# Patient Record
Sex: FEMALE | Race: OTHER | Employment: OTHER | URBAN - METROPOLITAN AREA
[De-identification: names, ages, dates, MRNs, and addresses within clinical notes are randomized per-mention and may not be internally consistent; named-entity substitution may affect disease eponyms.]

---

## 2017-01-24 ENCOUNTER — OFFICE VISIT (OUTPATIENT)
Dept: HEMATOLOGY | Age: 72
End: 2017-01-24

## 2017-01-24 ENCOUNTER — HOSPITAL ENCOUNTER (OUTPATIENT)
Dept: LAB | Age: 72
Discharge: HOME | End: 2017-01-24
Payer: MEDICARE

## 2017-01-24 VITALS
DIASTOLIC BLOOD PRESSURE: 48 MMHG | HEART RATE: 86 BPM | WEIGHT: 104 LBS | OXYGEN SATURATION: 98 % | SYSTOLIC BLOOD PRESSURE: 138 MMHG | RESPIRATION RATE: 16 BRPM | HEIGHT: 59 IN | BODY MASS INDEX: 20.96 KG/M2 | TEMPERATURE: 97.2 F

## 2017-01-24 DIAGNOSIS — K74.3 PRIMARY BILIARY CHOLANGITIS (HCC): Primary | ICD-10-CM

## 2017-01-24 DIAGNOSIS — K74.3 PRIMARY BILIARY CHOLANGITIS (HCC): ICD-10-CM

## 2017-01-24 LAB
ALBUMIN SERPL BCP-MCNC: 2.1 G/DL (ref 3.4–5)
ALBUMIN/GLOB SERPL: 0.4 {RATIO} (ref 0.8–1.7)
ALP SERPL-CCNC: 126 U/L (ref 45–117)
ALT SERPL-CCNC: 36 U/L (ref 13–56)
AMMONIA PLAS-SCNC: 41 UMOL/L (ref 11–32)
ANION GAP BLD CALC-SCNC: 10 MMOL/L (ref 3–18)
AST SERPL W P-5'-P-CCNC: 68 U/L (ref 15–37)
BASOPHILS # BLD AUTO: 0 K/UL (ref 0–0.06)
BASOPHILS # BLD: 1 % (ref 0–2)
BILIRUB DIRECT SERPL-MCNC: 3.6 MG/DL (ref 0–0.2)
BILIRUB SERPL-MCNC: 4.3 MG/DL (ref 0.2–1)
BUN SERPL-MCNC: 15 MG/DL (ref 7–18)
BUN/CREAT SERPL: 13 (ref 12–20)
CALCIUM SERPL-MCNC: 8.4 MG/DL (ref 8.5–10.1)
CHLORIDE SERPL-SCNC: 100 MMOL/L (ref 100–108)
CO2 SERPL-SCNC: 26 MMOL/L (ref 21–32)
CREAT SERPL-MCNC: 1.14 MG/DL (ref 0.6–1.3)
DIFFERENTIAL METHOD BLD: ABNORMAL
EOSINOPHIL # BLD: 0.1 K/UL (ref 0–0.4)
EOSINOPHIL NFR BLD: 1 % (ref 0–5)
ERYTHROCYTE [DISTWIDTH] IN BLOOD BY AUTOMATED COUNT: 15.1 % (ref 11.6–14.5)
ETHANOL SERPL-MCNC: 3 MG/DL (ref 0–3)
GLOBULIN SER CALC-MCNC: 5.1 G/DL (ref 2–4)
GLUCOSE SERPL-MCNC: 86 MG/DL (ref 74–99)
HCT VFR BLD AUTO: 34.4 % (ref 35–45)
HGB BLD-MCNC: 11.8 G/DL (ref 12–16)
INR PPP: 1.7 (ref 0.8–1.2)
LYMPHOCYTES # BLD AUTO: 10 % (ref 21–52)
LYMPHOCYTES # BLD: 0.6 K/UL (ref 0.9–3.6)
MCH RBC QN AUTO: 35.1 PG (ref 24–34)
MCHC RBC AUTO-ENTMCNC: 34.3 G/DL (ref 31–37)
MCV RBC AUTO: 102.4 FL (ref 74–97)
MONOCYTES # BLD: 0.6 K/UL (ref 0.05–1.2)
MONOCYTES NFR BLD AUTO: 10 % (ref 3–10)
NEUTS SEG # BLD: 4.8 K/UL (ref 1.8–8)
NEUTS SEG NFR BLD AUTO: 78 % (ref 40–73)
PLATELET # BLD AUTO: 95 K/UL (ref 135–420)
PMV BLD AUTO: 13.1 FL (ref 9.2–11.8)
POTASSIUM SERPL-SCNC: 3.9 MMOL/L (ref 3.5–5.5)
PROT SERPL-MCNC: 7.2 G/DL (ref 6.4–8.2)
PROTHROMBIN TIME: 19.6 SEC (ref 11.5–15.2)
RBC # BLD AUTO: 3.36 M/UL (ref 4.2–5.3)
SODIUM SERPL-SCNC: 136 MMOL/L (ref 136–145)
WBC # BLD AUTO: 6.1 K/UL (ref 4.6–13.2)

## 2017-01-24 PROCEDURE — 80048 BASIC METABOLIC PNL TOTAL CA: CPT | Performed by: NURSE PRACTITIONER

## 2017-01-24 PROCEDURE — 80307 DRUG TEST PRSMV CHEM ANLYZR: CPT | Performed by: NURSE PRACTITIONER

## 2017-01-24 PROCEDURE — 85025 COMPLETE CBC W/AUTO DIFF WBC: CPT | Performed by: NURSE PRACTITIONER

## 2017-01-24 PROCEDURE — 80076 HEPATIC FUNCTION PANEL: CPT | Performed by: NURSE PRACTITIONER

## 2017-01-24 PROCEDURE — 82107 ALPHA-FETOPROTEIN L3: CPT | Performed by: NURSE PRACTITIONER

## 2017-01-24 PROCEDURE — 82140 ASSAY OF AMMONIA: CPT | Performed by: NURSE PRACTITIONER

## 2017-01-24 PROCEDURE — 85610 PROTHROMBIN TIME: CPT | Performed by: NURSE PRACTITIONER

## 2017-01-24 PROCEDURE — 36415 COLL VENOUS BLD VENIPUNCTURE: CPT | Performed by: NURSE PRACTITIONER

## 2017-01-24 NOTE — MR AVS SNAPSHOT
Visit Information Date & Time Provider Department Dept. Phone Encounter #  
 1/24/2017 11:00 AM Delphine Felty, NP [facility] of [address] [place] [address] Street [id number] Follow-up Instructions Return in about 4 weeks (around 2/21/2017). Upcoming Health Maintenance Date Due DTaP/Tdap/Td series (1 - Tdap) 5/7/1968 BREAST CANCER SCRN MAMMOGRAM 5/7/1997 FOBT Q 1 YEAR AGE 50-75 5/7/1997 ZOSTER VACCINE AGE 60> 5/7/2007 GLAUCOMA SCREENING Q2Y 5/7/2012 OSTEOPOROSIS SCREENING (DEXA) 5/7/2012 Pneumococcal 65+ High/Highest Risk (1 of 2 - PCV13) 5/7/2012 MEDICARE YEARLY EXAM 5/7/2012 INFLUENZA AGE 9 TO ADULT 8/1/2016 Allergies as of 1/24/2017  Review Complete On: 1/24/2017 By: Delphine Felty, NP Severity Noted Reaction Type Reactions Demerol [Meperidine] Medium 06/29/2015    Unknown (comments) Patient became hot and flushed during colonoscopy 2006 Current Immunizations  Never Reviewed No immunizations on file. Not reviewed this visit You Were Diagnosed With   
  
 Codes Comments Primary biliary cholangitis (HCC)    -  Primary ICD-10-CM: G88.7 ICD-9-CM: 571.6 Vitals BP Pulse Temp Resp Height(growth percentile) 138/48 (BP 1 Location: Right arm, BP Patient Position: Sitting) 86 97.2 °F (36.2 °C) (Tympanic) 16 4' 11\" (1.499 m) Weight(growth percentile) SpO2 BMI OB Status Smoking Status 104 lb (47.2 kg) 98% 21.01 kg/m2 Hysterectomy Former Smoker Vitals History BMI and BSA Data Body Mass Index Body Surface Area 21.01 kg/m 2 1.4 m 2 Preferred Pharmacy Pharmacy Name Phone [facility] [phone] Your Updated Medication List  
  
   
This list is accurate as of: 1/24/17 11:35 AM.  Always use your most recent med list.  
  
  
  
  
 Mya MORGAN 315-200 mg-unit Tab Generic drug:  calcium citrate-vitamin d3  
 Take 1 Tab by mouth daily (with breakfast). colchicine 0.6 mg tablet Take 0.6 mg by mouth daily. FOSAMAX 70 mg tablet Generic drug:  alendronate Take 70 mg by mouth every Sunday. furosemide 20 mg tablet Commonly known as:  LASIX Take 1 Tab by mouth daily. spironolactone 50 mg tablet Commonly known as:  ALDACTONE Take 1 Tab by mouth daily. ursodiol 500 mg tablet Commonly known as:  ACTIGALL Take 250 mg by mouth three (3) times daily. vitamin E 400 unit capsule Commonly known as:  [address] Take  by mouth daily. Follow-up Instructions Return in about 4 weeks (around 2/21/2017). To-Do List   
 01/24/2017 Lab:  AFP WITH AFP-L3% 01/24/2017 Lab:  AMMONIA   
  
 01/24/2017 Lab:  CBC WITH AUTOMATED DIFF   
  
 01/24/2017 Lab:  ETHYL ALCOHOL   
  
 01/24/2017 Lab:  HEPATIC FUNCTION PANEL   
  
 01/24/2017 Lab:  METABOLIC PANEL, BASIC   
  
 01/24/2017 Lab:  PROTHROMBIN TIME + INR   
  
 01/24/2017 Imaging:  US ABD LTD W ELASTOGRAPHY   
  
 02/23/2017 GI:  EGD Introducing [facility]! Terra Rodriguez introduces [employer] patient portal. Now you can access parts of your medical record, email your doctor's office, and request medication refills online. 1. In your internet browser, go to [email or web address] 2. Click on the First Time User? Click Here link in the Sign In box. You will see the New Member Sign Up page. 3. Enter your [employer] Access Code exactly as it appears below. You will not need to use this code after youve completed the sign-up process. If you do not sign up before the expiration date, you must request a new code. · [employer] Access Code: [email or web address] Expires: 4/24/2017 11:35 AM 
 
4. Enter the last four digits of your Social Security Number (xxxx) and Date of Birth (mm/dd/yyyy) as indicated and click Submit.  You will be taken to the next sign-up page. 5. Create a [employer] ID. This will be your [employer] login ID and cannot be changed, so think of one that is secure and easy to remember. 6. Create a [employer] password. You can change your password at any time. 7. Enter your Password Reset Question and Answer. This can be used at a later time if you forget your password. 8. Enter your e-mail address. You will receive e-mail notification when new information is available in [address] 9. Click Sign Up. You can now view and download portions of your medical record. 10. Click the Download Summary menu link to download a portable copy of your medical information. If you have questions, please visit the Frequently Asked Questions section of the [employer] website. Remember, [employer] is NOT to be used for urgent needs. For medical emergencies, dial 911. Now available from your iPhone and Android! Please provide this summary of care documentation to your next provider. Your primary care clinician is listed as Eyvonne Nissen. If you have any questions after today's visit, please call [phone].

## 2017-01-24 NOTE — PROGRESS NOTES
[address], MD, FACP, [facility]     April LAUREN Bro PA-C Rufina Hugh, MD, MD Cole Cid NP Lendia Oiler, NP        [address] [facility], [address] [address]  22.     [phone]     FAX: [address] [place] [address], [address] [place] [address], [address]     [phone]     FAX: [phone]         Patient Care Team:  Keyla Chavarria MD as PCP - General (Family Practice)  Valerie Chan MD (Gastroenterology)  Gabrielle Johnson MD (Hematology and Oncology)      Problem List  Date Reviewed: 1/24/2017          Codes Class Noted    PBC (primary biliary cirrhosis) [facility]) ICD-10-CM: K74.3  ICD-9-CM: 571.6  6/21/2016        Cirrhosis of liver with ascites ([address] ICD-10-CM: K74.60  ICD-9-CM: 571.5  4/27/2016        Non-Hodgkin's lymphoma ([facility] ICD-10-CM: C85.90  ICD-9-CM: 202.80  4/27/2016    Overview Signed 4/27/2016 11:21 AM by Laura Hollingsworth MD     Chemotherapy 2015             AVM (arteriovenous malformation) brain ICD-10-CM: Q28.2  ICD-9-CM: 747.81  4/27/2016        S/P SHIMON (total abdominal hysterectomy) ICD-10-CM: Z90.710  ICD-9-CM: V88.01  4/27/2016              Harriett Gallardo returns to the [place] [address] today for education and management of cirrhosis secondary to [facility]. The active problem list, all pertinent past medical history, medications, liver histology, endoscopic studies, radiologic findings and laboratory findings related to the liver disorder were reviewed with the patient. The patient is a 73 y.o.  - [place] female who was first found to have chronic liver disease and cirrhosis in 2004 when she developed ascites. She was to be seen two months after her last appointment with me in 06/2016, but this is her first time back in over 7 months.       Ascites had resolved with diuretics, but she now complains of fluid accumulation. Edema had resolved with diuretics, but she now complains of lower extremity edema. The patient has not developed hepatic encephalopathy. The patient underwent an EGD in 07/2016 and several large varices were banded. She was to return for a repeat EGD 6 weeks later (in 08/2016) but did not. The liver disease has been treated with NHI. The most recent laboratory studies indicate that the AST is elevated, the ALT is normal, alkaline phosphatase is slightly elevated, tests of hepatic synthetic and metabolic function are depressed, and the platelet count is depressed. The patient has no complaints which can be attributed to liver disease. The patient completes all daily activities without any functional limitations. The patient has not experienced fatigue, fevers, chills, shortness of breath, chest pain, pain in the right side over the liver, diffuse abdominal pain, nausea, vomiting, constipation, diarrhrea, dry eyes, dry mouth, arthralgias, myalgias, yellowing of the eyes or skin, itching, dark urine, problems concentrating, swelling of the abdomen, swelling of the lower extremities, hematemesis, or hematochezia. ALLERGIES  Allergies   Allergen Reactions    Demerol [Meperidine] Unknown (comments)     Patient became hot and flushed during colonoscopy 2006       MEDICATIONS  Current Outpatient Prescriptions   Medication Sig    furosemide (LASIX) 20 mg tablet Take 1 Tab by mouth daily.  spironolactone (ALDACTONE) 50 mg tablet Take 1 Tab by mouth daily.  vitamin E (AQUA GEMS) 400 unit capsule Take  by mouth daily.  alendronate (FOSAMAX) 70 mg tablet Take 70 mg by mouth every Sunday.  colchicine 0.6 mg tablet Take 0.6 mg by mouth daily.  ursodiol (ACTIGALL) 500 mg tablet Take 250 mg by mouth three (3) times daily.     calcium citrate-vitamin d3 (CITRACAL + D) 315-200 mg-unit tab Take 1 Tab by mouth daily (with breakfast). No current facility-administered medications for this visit. SYSTEM REVIEW NOT RELATED TO LIVER DISEASE OR REVIEWED ABOVE:  Constitution systems: Negative for fever, chills, weight gain, weight loss. Eyes: Negative for visual changes. ENT: Negative for sore throat, painful swallowing. Respiratory: Negative for cough, hemoptysis, SOB. Cardiology: Negative for chest pain, palpitations. GI:  Negative for constipation or diarrhea. : Negative for urinary frequency, dysuria, hematuria, nocturia. Skin: Negative for rash. Hematology: Negative for easy bruising, blood clots. Musculo-skelatal: Negative for back pain, muscle pain, weakness. Neurologic: Negative for headaches, dizziness, vertigo, memory problems not related to HE. Psychology: Negative for anxiety, depression. FAMILY HISTORY:  The father  of accident/trauma. The mother  of old age. There is no family history of liver disease. SOCIAL HISTORY:  The patient is . The patient has 3 children, and 3 grandchildren. The patient stopped using tobacco products in . The patient has never consumed significant amounts of alcohol. The patient used to work as a . The patient has not worked since . PHYSICAL EXAMINATION:  Visit Vitals    /48 (BP 1 Location: Right arm, BP Patient Position: Sitting)    Pulse 86    Temp 97.2 °F (36.2 °C) (Tympanic)    Resp 16    Ht 4' 11\" (1.499 m)    Wt 104 lb (47.2 kg)    SpO2 98%    BMI 21.01 kg/m2     General: Ill appearing. Eyes: Sclera anicteric. ENT: No oral lesions. Thyroid normal.  Nodes: No adenopathy. Skin: No spider angiomata. No jaundice. Palmar erythema. Respiratory: Lungs clear to auscultation. Cardiovascular: Regular heart rate. Systolic murmur. No JVD. Abdomen: Soft non-tender. Liver size normal to percussion/palpation. Spleen not palpable. No obvious ascites. Extremities: No edema.   No muscle wasting. Neurologic: Alert and oriented. Cranial nerves grossly intact. No asterixis. LABORATORY STUDIES:  [facility] [address] & Units 6/21/2016 5/25/2016 4/27/2016   WBC 4.6 - 13.2 K/uL 3.9 (L) 3.2 (L) 3.1 (L)   ANC 1.8 - 8.0 K/UL 2.7 1.9 2.1   HGB 12.0 - 16.0 g/dL 11.9 (L) 11.2 (L) 12.3    - 420 K/uL 52 (L) 55 (L) 57 (L)   INR 0.8 - 1.2   1.5 (H) 1.4 (H) 1.2   AST 15 - 37 U/L 63 (H) 60 (H) 58 (H)   ALT 13 - 56 U/L 36 31 33   Alk Phos 45 - 117 U/L 216 (H) 121 (H) 124 (H)   Bili, Total 0.2 - 1.0 MG/DL 1.9 (H) 3.0 (H) 2.8 (H)   Bili, Direct 0.0 - 0.2 MG/DL 1.2 (H) 1.9 (H) 1.6 (H)   Albumin 3.4 - 5.0 g/dL 2.6 (L) 2.6 (L) 2.8 (L)   BUN 7.0 - 18 MG/DL 13 12 14   Creat 0.6 - 1.3 MG/DL 0.96 1.14 1.02   Na 136 - 145 mmol/L 139 145 145   K 3.5 - 5.5 mmol/L 3.5 3.5 3.6   Cl 100 - 108 mmol/L 104 109 (H) 106   CO2 21 - 32 mmol/L 25 27 32   Glucose 74 - 99 mg/dL 64 (L) 86 72 (L)     SEROLOGIES:  Serologies Latest Ref Rng 4/27/2016   Hep A Ab, Total NEGATIVE   Positive (A)   Hep B Surface Ag <1.00 Index <0.10   Hep B Surface Ag Interp NEG   NEGATIVE   Hep B Core Ab, Total NEGATIVE   Positive (A)   Hep B Surface Ab >10.0 mIU/mL 70.82   Hep B Surface Ab Interp POS   POSITIVE   Hep C Ab 0.0 - 0.9 s/co ratio <0.1   Ferritin 8 - 388 NG/   Iron % Saturation  88   RICARDO, IFA  See patterns   RICARDO Pattern  1:80   C-ANCA Neg:<1:20 titer <1:20   P-ANCA Neg:<1:20 titer <1:20   ANCA Neg:<1:20 titer <1:20   ASMCA 0 - 19 Units 40 (H)   M2 Ab 0.0 - 20.0 Units 142.6 (H)   Alpha-1 antitrypsin level 90 - 200 mg/dL 144     LIVER HISTOLOGY:  Not available or performed    ENDOSCOPIC PROCEDURES:  6/2015. Colonoscopy by Dr Benjamin July. Biopsy of rectum - normal.  07/2016. EGD by Dr. Boby Smith. Multiple large esophageal varices were identified and banded. RADIOLOGY:  01/2016. Abdominal ultrasound. Consistent wit cirrhosis. No hepatic masses. 05/2016. Abdominal ultrasound with elastography.   Shear wave elastography NOT done. US consistent with cirrhosis. No hepatic masses. OTHER TESTING:  Not available or performed    ASSESSMENT AND PLAN:  Cirrhosis secondary to [facility]. AST is elevated, ALT is normal, alkaline phosphatase is nearly normal but slightly increased, liver function is depressed. Total bilirubin is elevated. Serum albumin is depressed. The platelet count is depressed. Will perform laboratory testing to monitor liver function and degree of liver injury. This will include hepatic panel, a CBC w/ diff, a BMP, a PT/INR, an ammonia level, alcohol screening, and an AFP-L3%. Complications of cirrhosis were discussed in detail. We discussed thrombocytopenia, portal hypertension, varices, GI bleeding, peripheral edema, ascites, hepatic encephalopathy, and hepatocellular carcinoma. We discussed the need for follow ups on a regular basis, at 3 month intervals to monitor for complications. We discussed the need for every 6 month liver imaging studies. The patient appears acutely ill. I asked that she follow up with her oncologist ASAP. She reports that she has an appointment with him in June or July. This is too long to wait. She was scheduled for a 2 months follow up after her initial visit with me back in 06/2016. She did not keep that appointment. She had an EGD by Dr. Boby Smith in 07/2016 during which several large esophageal varices were banded. She was to have this repeated 6 weeks later, but she again did not show up. EGD was ordered today. I stressed the importance of keeping her appointments. The patient is not a candidate for liver transplantation because of an extrahepatic malignancy. This will have been in remission for 5 years in 2020. Discussed extrahepatic manifestations of PBC such as osteoporosis, elevated cholesterol and slight increased risk of breast cancer. The risk of osteoporosis is increased in patients with PBC.  DEXA bone density to assess for osteoporosis should be ordered by the patients primary care physician. The patient was advised to take calcium supplementation and Vitamin D. She was advised to have her mammogram completed. She was advised to take supplemental vitamin A, D, E, and K. Patients with PBC are at increased risk for hypercholesterolemia. However, this is not associated with an increased risk of cardiac disease and MI because this is typically an elevation in HDL cholesterol. There is no contraindication for treatment with a statin if this is felt to be indicated based upon cardiac risk assessment. Ascites had resolved with diuretics, but the patient reports fluid accumulation in her abdomen. Ultrasound was ordered today. I will increase her diuretics based on the ultrasound findings. Her belly was soft on today's physical examination. Hepatic encephalopathy has not developed to date. There is no need for treatment with lactulose and/or Xifaxan at this time. NHs level ordered today. The patient was directed to continue all current medications at the current dosages. There are no contraindications for the patient to take any medications that are necessary for treatment of other medical issues. The patient was counseled regarding alcohol consumption. Thrombocytopenia secondary to cirrhosis. There is no evidence of overt bleeding. There is no indication for platelet transfusions or pharmacologic treatment to increase the platelet count. Vaccination for viral hepatitis A and B is not needed. The patient has serologic evidence of prior exposure or vaccination with immunity. [address] screening was recently performed. No evidence of emerging HCC. The need to perform an assessment of liver fibrosis was discussed with the patient. Elastography  can assess liver fibrosis and determine if a patient has advanced fibrosis or cirrhosis without the need for liver biopsy.   This can also be performed with ultrasound. This was ordered today. All of the above issues were discussed with the patient. All questions were answered. The patient expressed a clear understanding of the above. 35 minutes total time spent with this patient with more than 50% of this time spent counseling and coordinating care as described above. [address] [place] [address] in 4 weeks.      Delphine Felty, NP   [facility] [place] of [address] [place] [address], [address] [place] [address] Latasha Valles, [address] [facility]   [phone]

## 2017-01-25 LAB
AFP L3 MFR SERPL: NORMAL % (ref 0–9.9)
AFP SERPL-MCNC: 3.1 NG/ML (ref 0–8)

## 2017-02-09 ENCOUNTER — TELEPHONE (OUTPATIENT)
Dept: HEMATOLOGY | Age: 72
End: 2017-02-09

## 2017-02-14 ENCOUNTER — SURGERY (OUTPATIENT)
Age: 72
End: 2017-02-14

## 2017-02-14 ENCOUNTER — HOSPITAL ENCOUNTER (OUTPATIENT)
Dept: ULTRASOUND IMAGING | Age: 72
Discharge: HOME | End: 2017-02-14
Payer: MEDICARE

## 2017-02-14 ENCOUNTER — TELEPHONE (OUTPATIENT)
Dept: HEMATOLOGY | Age: 72
End: 2017-02-14

## 2017-02-14 ENCOUNTER — HOSPITAL ENCOUNTER (OUTPATIENT)
Age: 72
Setting detail: OUTPATIENT SURGERY
Discharge: HOME | End: 2017-02-14
Attending: INTERNAL MEDICINE | Admitting: INTERNAL MEDICINE
Payer: MEDICARE

## 2017-02-14 VITALS
TEMPERATURE: 96.8 F | SYSTOLIC BLOOD PRESSURE: 123 MMHG | RESPIRATION RATE: 16 BRPM | OXYGEN SATURATION: 100 % | HEIGHT: 59 IN | DIASTOLIC BLOOD PRESSURE: 68 MMHG | HEART RATE: 85 BPM | WEIGHT: 106 LBS | BODY MASS INDEX: 21.37 KG/M2

## 2017-02-14 DIAGNOSIS — K74.3 PRIMARY BILIARY CHOLANGITIS (HCC): ICD-10-CM

## 2017-02-14 PROCEDURE — 76040000019: Performed by: INTERNAL MEDICINE

## 2017-02-14 PROCEDURE — 74011000250 HC RX REV CODE- 250: Performed by: INTERNAL MEDICINE

## 2017-02-14 PROCEDURE — 74011250636 HC RX REV CODE- 250/636

## 2017-02-14 PROCEDURE — 74011250636 HC RX REV CODE- 250/636: Performed by: INTERNAL MEDICINE

## 2017-02-14 RX ORDER — DEXTROMETHORPHAN/PSEUDOEPHED 2.5-7.5/.8
1.2 DROPS ORAL
Status: CANCELLED | OUTPATIENT
Start: 2017-02-14

## 2017-02-14 RX ORDER — NALOXONE HYDROCHLORIDE 0.4 MG/ML
0.4 INJECTION, SOLUTION INTRAMUSCULAR; INTRAVENOUS; SUBCUTANEOUS
Status: DISCONTINUED | OUTPATIENT
Start: 2017-02-14 | End: 2017-02-14 | Stop reason: HOSPADM

## 2017-02-14 RX ORDER — SODIUM CHLORIDE 9 MG/ML
125 INJECTION, SOLUTION INTRAVENOUS CONTINUOUS
Status: DISCONTINUED | OUTPATIENT
Start: 2017-02-14 | End: 2017-02-14 | Stop reason: HOSPADM

## 2017-02-14 RX ORDER — MIDAZOLAM HYDROCHLORIDE 1 MG/ML
.25-5 INJECTION, SOLUTION INTRAMUSCULAR; INTRAVENOUS
Status: DISCONTINUED | OUTPATIENT
Start: 2017-02-14 | End: 2017-02-14 | Stop reason: HOSPADM

## 2017-02-14 RX ORDER — SPIRONOLACTONE 100 MG/1
100 TABLET, FILM COATED ORAL DAILY
Qty: 90 TAB | Refills: 3 | Status: SHIPPED | OUTPATIENT
Start: 2017-02-14

## 2017-02-14 RX ORDER — SODIUM CHLORIDE 0.9 % (FLUSH) 0.9 %
5-10 SYRINGE (ML) INJECTION AS NEEDED
Status: CANCELLED | OUTPATIENT
Start: 2017-02-14 | End: 2017-02-14

## 2017-02-14 RX ORDER — ATROPINE SULFATE 0.1 MG/ML
0.5 INJECTION INTRAVENOUS
Status: CANCELLED | OUTPATIENT
Start: 2017-02-14 | End: 2017-02-14

## 2017-02-14 RX ORDER — DIPHENHYDRAMINE HYDROCHLORIDE 50 MG/ML
50 INJECTION, SOLUTION INTRAMUSCULAR; INTRAVENOUS ONCE
Status: CANCELLED | OUTPATIENT
Start: 2017-02-14 | End: 2017-02-14

## 2017-02-14 RX ORDER — FLUMAZENIL 0.1 MG/ML
0.2 INJECTION INTRAVENOUS
Status: DISCONTINUED | OUTPATIENT
Start: 2017-02-14 | End: 2017-02-14 | Stop reason: HOSPADM

## 2017-02-14 RX ORDER — FUROSEMIDE 40 MG/1
TABLET ORAL
Qty: 60 TAB | Refills: 3 | Status: SHIPPED | OUTPATIENT
Start: 2017-02-14

## 2017-02-14 RX ORDER — EPINEPHRINE 0.1 MG/ML
1 INJECTION INTRACARDIAC; INTRAVENOUS
Status: CANCELLED | OUTPATIENT
Start: 2017-02-14 | End: 2017-02-14

## 2017-02-14 RX ORDER — SODIUM CHLORIDE 0.9 % (FLUSH) 0.9 %
5-10 SYRINGE (ML) INJECTION EVERY 8 HOURS
Status: CANCELLED | OUTPATIENT
Start: 2017-02-14 | End: 2017-02-14

## 2017-02-14 RX ORDER — FENTANYL CITRATE 50 UG/ML
100 INJECTION, SOLUTION INTRAMUSCULAR; INTRAVENOUS
Status: DISCONTINUED | OUTPATIENT
Start: 2017-02-14 | End: 2017-02-14 | Stop reason: HOSPADM

## 2017-02-14 RX ADMIN — FENTANYL CITRATE 25 MCG: 50 INJECTION, SOLUTION INTRAMUSCULAR; INTRAVENOUS at 11:58

## 2017-02-14 RX ADMIN — MIDAZOLAM HYDROCHLORIDE 1 MG: 1 INJECTION, SOLUTION INTRAMUSCULAR; INTRAVENOUS at 12:00

## 2017-02-14 RX ADMIN — MIDAZOLAM HYDROCHLORIDE 1 MG: 1 INJECTION, SOLUTION INTRAMUSCULAR; INTRAVENOUS at 11:56

## 2017-02-14 RX ADMIN — MIDAZOLAM HYDROCHLORIDE 1 MG: 1 INJECTION, SOLUTION INTRAMUSCULAR; INTRAVENOUS at 11:58

## 2017-02-14 RX ADMIN — BENZOCAINE 2 SPRAY: 220 SPRAY, METERED PERIODONTAL at 11:54

## 2017-02-14 RX ADMIN — FENTANYL CITRATE 25 MCG: 50 INJECTION, SOLUTION INTRAMUSCULAR; INTRAVENOUS at 11:56

## 2017-02-14 RX ADMIN — SODIUM CHLORIDE 125 ML/HR: 900 INJECTION, SOLUTION INTRAVENOUS at 11:40

## 2017-02-14 NOTE — PROCEDURES
UPPER ENDOSCOPY PROCEDURE NOTE    Sera Rodriguez  1945    INDICATION: Cirrhosis. Screening for esophageal varices with variceal ligation if  indicated. : Kimberly Schmitz MD    ASSISTANT: NONE    ANESTHESIA/SEDATION: Versed 3 mg and Fentanyl 50 mcg     PROCEDURE DESCRIPTION:  [employer] consent was obtained from the patient for the procedure. All risks and benefits of the procedure explained. The patient was taken to the endoscopy suite and placed in the left lateral decubitus position. Following sequential administration of sedation to doses as indicated above the endoscope was inserted into the mouth and advanced under direct vision to the second portion of the duodenum. Careful inspection of upper gastrointestinal tract was made as the endoscope was inserted and withdrawn. Retroflexion of the endoscope to view of the cardia of the stomach was performed. The findings and interventions are described below. FINDINGS:  Esophagus:    Small esophageal varices. No banding performed. Previous banding site scar. Stomach: Moderate portal hypertensive gastropathy of the entire stomach. No gastric varices identified. Duodenum:   Normal bulb and second portion    INTERVENTION: None. COMPLICATIONS: None. The patient tolerated the procedure well. EBL: Negligible. RECOMMENDATIONS:  Observe until discharge parameters are achieved. May resume previous diet. Repeat endoscopy to reassess varices and need for banding in 6 months. Follow-up Liver [facility] office as scheduled.       Kimberly Schmtiz MD  2/14/2017  12:03 PM

## 2017-02-14 NOTE — H&P
PRE-PROCEDURE NOTE - EGD    H and P from last office visit reviewed. Allergies reviewed. Out-patient medicaton list reviewed. Patient Active Problem List   Diagnosis Code    [facility] K74.60    Non-Hodgkin's lymphoma ([facility] C85.90    AVM (arteriovenous malformation) brain Q28.2    S/P SHIMON (total abdominal hysterectomy) Z90.710    PBC (primary biliary cirrhosis) ([facility] K74.3       EGD to assess for esophageal varices. The risks of the procedure were discussed with the patient. These included reaction to anesthesia, pain, perforation and bleeding. All questions were answered. The patient wishes to proceed with the procedure. PHYSICAL EXAMINATION:  VS: per nursing note  General: No acute distress. Eyes: Sclera anicteric. ENT: No oral lesions. Thyroid normal.  Nodes: No adenopathy. Skin: No spider angiomata. No jaundice. No palmar erythema. Respiratory: Lungs clear to auscultation. Cardiovascular: Regular heart rate. No murmurs. No JVD. Abdomen: Soft non-tender, liver size normal to percussion/palpation. Spleen not palpable. No obvious ascites. Extremities: No edema. No muscle wasting. No gross arthritic changes. Neurologic: Alert and oriented. Cranial nerves grossly intact. No asterixis. MOST RECENT LABORATORY STUDIES:  Lab Results   Component Value Date/Time    WBC 6.1 01/24/2017 01:21 PM    HGB 11.8 01/24/2017 01:21 PM    HCT 34.4 01/24/2017 01:21 PM    PLATELET 95 95/22/3713 01:21 PM    .4 01/24/2017 01:21 PM     Lab Results   Component Value Date/Time    INR 1.7 01/24/2017 01:21 PM    INR 1.5 06/21/2016 09:37 AM    INR 1.4 05/25/2016 02:55 PM    Prothrombin time 19.6 01/24/2017 01:21 PM    Prothrombin time 17.6 06/21/2016 09:37 AM    Prothrombin time 17.4 05/25/2016 02:55 PM       ASSESSMENT AND PLAN:  EGD to assess for esophageal varices. Conscious sedation with fentanyl and versed.           Nilay Cool MD  [facility] [place] of [facility], [address]  [phone]

## 2017-02-14 NOTE — IP AVS SNAPSHOT
Merlin Laroche 
 
 
 [address] [facility] [address] 
[phone] Patient: Lucian Fang MRN: [id number] You are allergic to the following Allergen Reactions Demerol (Meperidine) Unknown (comments) Patient became hot and flushed during colonoscopy 2006 Recent Documentation Height Weight BMI OB Status Smoking Status 1.499 m 48.1 kg 21.41 kg/m2 Hysterectomy Former Smoker Emergency Contacts Name Discharge Info Relation Home Work Mobile Kathie Rodriguez DISCHARGE CAREGIVER [3] Spouse [3]   [phone] About your hospitalization You were admitted on:  February 14, 2017 You last received care in the:  [facility] ENDOSCOPY You were discharged on:  February 14, 2017 Unit phone number:  [phone] Why you were hospitalized Your primary diagnosis was:  Not on File Providers Seen During Your Hospitalizations Provider Role Specialty Primary office phone Sarina Earl MD Attending Provider Gastroenterology [phone] Your Primary Care Physician (PCP) Primary Care Physician Office Phone Office Fax Jose Cruz Pendleton [phone] Follow-up Information Follow up With Details Comments Contact Info Arvin Borja MD   [address] [place] [address] 
[phone] Your Appointments Thursday February 23, 2017 11:30 AM EST Follow Up with Enid Hutson NP [facility] ([facility])  
 [address] [place] [address]  
[phone] Current Discharge Medication List  
  
CONTINUE these medications which have CHANGED Dose & Instructions Dispensing Information Comments Morning Noon Evening Bedtime * furosemide 20 mg tablet Commonly known as:  LASIX What changed:  Another medication with the same name was added.  Make sure you understand how and when to take each. Your next dose is: Today, Tomorrow Other:  _________ Dose:  20 mg Take 1 Tab by mouth daily. Quantity:  90 Tab Refills:  3  
     
   
   
   
  
 * furosemide 40 mg tablet Commonly known as:  LASIX What changed: You were already taking a medication with the same name, and this prescription was added. Make sure you understand how and when to take each. Your next dose is: Today, Tomorrow Other:  _________ One tab po daily Quantity:  60 Tab Refills:  3  
     
   
   
   
  
 * spironolactone 50 mg tablet Commonly known as:  ALDACTONE What changed:  Another medication with the same name was added. Make sure you understand how and when to take each. Your next dose is: Today, Tomorrow Other:  _________ Dose:  50 mg Take 1 Tab by mouth daily. Quantity:  90 Tab Refills:  3  
     
   
   
   
  
 * spironolactone 100 mg tablet Commonly known as:  ALDACTONE What changed: You were already taking a medication with the same name, and this prescription was added. Make sure you understand how and when to take each. Your next dose is: Today, Tomorrow Other:  _________ Dose:  100 mg Take 1 Tab by mouth daily. Indications: ASCITES Quantity:  90 Tab Refills:  3  
     
   
   
   
  
 * Notice: This list has 4 medication(s) that are the same as other medications prescribed for you. Read the directions carefully, and ask your doctor or other care provider to review them with you. CONTINUE these medications which have NOT CHANGED Dose & Instructions Dispensing Information Comments Morning Noon Evening Bedtime CITRACAL PLUS D 315-200 mg-unit Tab Generic drug:  calcium citrate-vitamin d3 Your next dose is: Today, Tomorrow Other:  _________ Dose:  1 Tab Take 1 Tab by mouth daily (with breakfast). Refills:  0 colchicine 0.6 mg tablet Your next dose is: Today, Tomorrow Other:  _________ Dose:  0.6 mg Take 0.6 mg by mouth daily. Refills:  0  
     
   
   
   
  
 FOSAMAX 70 mg tablet Generic drug:  alendronate Your next dose is: Today, Tomorrow Other:  _________ Dose:  70 mg Take 70 mg by mouth every Sunday. Refills:  0  
     
   
   
   
  
 ursodiol 500 mg tablet Commonly known as:  ACTIGALL Your next dose is: Today, Tomorrow Other:  _________ Dose:  250 mg Take 250 mg by mouth three (3) times daily. Refills:  0  
     
   
   
   
  
 vitamin E 400 unit capsule Commonly known as:  [address] Your next dose is: Today, Tomorrow Other:  _________ Take  by mouth daily. Refills:  0 Where to Get Your Medications Information on where to get these meds will be given to you by the nurse or doctor. ! Ask your nurse or doctor about these medications  
  furosemide 40 mg tablet  
 spironolactone 100 mg tablet Discharge Instructions [address] [place] [address] Laura Hollingsworth MD, LAUREN James PA-C Rufina Hugh, MD, MD Araceli Pruett NP Lendia Oiler, NP [address] [facility], [address] [place] [address] 22. 
   [phone] FAX: [address], [address] [place] [address], [address] [place] [address] - [address] [facility] 
   [phone] FAX: [phone] ENDOSCOPY DISCHARGE INSTRUCTIONS [id number] 1945 Date: 2/14/2017 DISCOMFORT: 
Use lozenges or warm salt water gargle for sore thoat Apply warm compress to IV site if red. If redness or soreness persists call the office. You may experience gas and bloating. Walking and belching will help relieve this. You may experience chest discomfort or pressure especially if banding of esophageal varices was performed. DIET: 
You may resume your normal diet. ACTIVITY: 
Spend the remainder of the day resting. Avoid any strenuous activity. You may not operate a vehicle for 12 hours. You may not engage in an occupation involving machinery or appliances for rest of today. Avoid making any critical or financial decisions for at least 24 hour. Call the [employer] [facility] [address] [facility] if you have any of the following: 
Increasing chest or abdominal pain, nausea, vomiting, vomiting blood, abdominal distension or swelling, fever or chills, bloody discharge from nose or mouth or shortness of breath. Follow-up Instructions: 
Call Dr. Erick Forte for any questions or problems at the phone number listed above. If a biopsy was performed, you will be contacted by the office staff or Dr Erick Forte within 1 week. If you have not heard from us by then you may call the office at the phone number listed above to inquire about the results. ENDOSCOPY FINDINGS: 
Your endoscopy demonstrated esophageal varices. No banding performed today. DISCHARGE SUMMARY from the Nurse: The following personal items collected during your admission are returned to you:  
Dental Appliance: Dental Appliances: Lowers, Uppers Vision: Visual Aid: None Hearing Aid:   
Jewelry:   
Clothing:   
Other Valuables:   
Valuables sent to safe:    
 
Patient armband removed and shredded DISCHARGE SUMMARY from Nurse The following personal items are in your possession at time of discharge: 
 
Dental Appliances: Lowers, Uppers Visual Aid: None PATIENT INSTRUCTIONS: 
 
 
F-face looks uneven A-arms unable to move or move unevenly S-speech slurred or non-existent T-time-call 911 as soon as signs and symptoms begin-DO NOT go Back to bed or wait to see if you get better-TIME IS BRAIN. Warning Signs of HEART ATTACK Call 911 if you have these symptoms: 
? Chest discomfort. Most heart attacks involve discomfort in the center of the chest that lasts more than a few minutes, or that goes away and comes back. It can feel like uncomfortable pressure, squeezing, fullness, or pain. ? Discomfort in other areas of the upper body. Symptoms can include pain or discomfort in one or both arms, the back, neck, jaw, or stomach. ? Shortness of breath with or without chest discomfort. ? Other signs may include breaking out in a cold sweat, nausea, or lightheadedness. Don't wait more than five minutes to call [address]! Fast action can save your life. Calling 911 is almost always the fastest way to get lifesaving treatment. Emergency Medical Services staff can begin treatment when they arrive  up to an hour sooner than if someone gets to the hospital by car. The discharge information has been reviewed with the patient and spouse. The patient and spouse verbalized understanding. Discharge medications reviewed with the patient and spouse and appropriate educational materials and side effects teaching were provided. Discharge Orders None Introducing \[facility] [place] [facility]\"" & [facility] SERVICES! Shoshana Ceja introduces Veeker patient portal. Now you can access parts of your medical record, email your doctor's office, and request medication refills online. 1. In your internet browser, go to [email or web address] 2. Click on the First Time User? Click Here link in the Sign In box. You will see the New Member Sign Up page. 3. Enter your Veeker Access Code exactly as it appears below.  You will not need to use this code after youve completed the sign-up process. If you do not sign up before the expiration date, you must request a new code. · Imindi Access Code: [email or web address] Expires: 4/24/2017 11:35 AM 
 
4. Enter the last four digits of your Social Security Number (xxxx) and Date of Birth (mm/dd/yyyy) as indicated and click Submit. You will be taken to the next sign-up page. 5. Create a Imindi ID. This will be your Imindi login ID and cannot be changed, so think of one that is secure and easy to remember. 6. Create a Imindi password. You can change your password at any time. 7. Enter your Password Reset Question and Answer. This can be used at a later time if you forget your password. 8. Enter your e-mail address. You will receive e-mail notification when new information is available in [address] 9. Click Sign Up. You can now view and download portions of your medical record. 10. Click the Download Summary menu link to download a portable copy of your medical information. If you have questions, please visit the Frequently Asked Questions section of the Imindi website. Remember, Imindi is NOT to be used for urgent needs. For medical emergencies, dial 911. Now available from your iPhone and Android! General Information Please provide this summary of care documentation to your next provider. Patient Signature:  ____________________________________________________________ Date:  ____________________________________________________________  
  
Miryam Wagner Provider Signature:  ____________________________________________________________ Date:  ____________________________________________________________

## 2017-03-20 ENCOUNTER — TELEPHONE (OUTPATIENT)
Dept: HEMATOLOGY | Age: 72
End: 2017-03-20
